# Patient Record
(demographics unavailable — no encounter records)

---

## 2025-03-17 NOTE — PROCEDURE
[FreeTextEntry1] :  Bilateral Cerumen Impaction [FreeTextEntry2] :  Bilateral Cerumen Impaction [FreeTextEntry3] : PROCEDURE:  Bilateral Cerumen Removal   After informed verbal consent is obtained, binocular microscopy is used to remove cerumen from the left ear canal with a curette and suction.   After informed verbal consent is obtained, binocular microscopy is used to remove cerumen from the right ear canal with a curette and suction.

## 2025-03-17 NOTE — CONSULT LETTER
[Courtesy Letter:] : I had the pleasure of seeing your patient, [unfilled], in my office today. [Sincerely,] : Sincerely, [FreeTextEntry2] : Dr. Leo Ceja [address] [place] [address] [FreeTextEntry3] : Trae Odonnell MD Chief, Pediatric Otolaryngology [facility] and [facility] Professor of Otolaryngology [facility] [employer] [facility]

## 2025-03-17 NOTE — HISTORY OF PRESENT ILLNESS
[Snoring] : snoring [No Personal or Family History of Easy Bruising, Bleeding, or Issues with General Anesthesia] : No Personal or Family History of easy bruising, bleeding, or issues with general anesthesia [de-identified] : 3 year old girl here for evaluation of snoring. +Chronic nasal congestion Hx asthma. Denies recent ear infections, otalgia, and otorrhea. Mouth breather - snores. Denies choking, gasps, or pauses. Pending sleep study 25 with pulmonology. Never tried nasal steroid spray.  Denies throat infections. 1 recent ear infection No speech or language delay Passed the  hearing screen